# Patient Record
Sex: FEMALE | Race: AMERICAN INDIAN OR ALASKA NATIVE
[De-identification: names, ages, dates, MRNs, and addresses within clinical notes are randomized per-mention and may not be internally consistent; named-entity substitution may affect disease eponyms.]

---

## 2019-02-02 ENCOUNTER — HOSPITAL ENCOUNTER (EMERGENCY)
Dept: HOSPITAL 5 - ED | Age: 54
Discharge: HOME | End: 2019-02-02
Payer: SELF-PAY

## 2019-02-02 DIAGNOSIS — I10: ICD-10-CM

## 2019-02-02 DIAGNOSIS — E78.00: ICD-10-CM

## 2019-02-02 DIAGNOSIS — K21.9: ICD-10-CM

## 2019-02-02 DIAGNOSIS — R11.2: ICD-10-CM

## 2019-02-02 DIAGNOSIS — Z90.711: ICD-10-CM

## 2019-02-02 DIAGNOSIS — F17.200: ICD-10-CM

## 2019-02-02 DIAGNOSIS — E11.9: ICD-10-CM

## 2019-02-02 DIAGNOSIS — K52.9: Primary | ICD-10-CM

## 2019-02-02 DIAGNOSIS — Z79.4: ICD-10-CM

## 2019-02-02 LAB
ALBUMIN SERPL-MCNC: 4.8 G/DL (ref 3.9–5)
ALT SERPL-CCNC: 15 UNITS/L (ref 7–56)
BASOPHILS # (AUTO): 0.1 K/MM3 (ref 0–0.1)
BASOPHILS NFR BLD AUTO: 1.1 % (ref 0–1.8)
BILIRUB UR QL STRIP: (no result)
BLOOD UR QL VISUAL: (no result)
BUN SERPL-MCNC: 8 MG/DL (ref 7–17)
BUN/CREAT SERPL: 10 %
CALCIUM SERPL-MCNC: 9.7 MG/DL (ref 8.4–10.2)
EOSINOPHIL # BLD AUTO: 0.1 K/MM3 (ref 0–0.4)
EOSINOPHIL NFR BLD AUTO: 1.7 % (ref 0–4.3)
HCT VFR BLD CALC: 41.6 % (ref 30.3–42.9)
HEMOLYSIS INDEX: 1
HGB BLD-MCNC: 14.6 GM/DL (ref 10.1–14.3)
LYMPHOCYTES # BLD AUTO: 2.1 K/MM3 (ref 1.2–5.4)
LYMPHOCYTES NFR BLD AUTO: 34.4 % (ref 13.4–35)
MCHC RBC AUTO-ENTMCNC: 35 % (ref 30–34)
MCV RBC AUTO: 83 FL (ref 79–97)
MONOCYTES # (AUTO): 0.6 K/MM3 (ref 0–0.8)
MONOCYTES % (AUTO): 9.8 % (ref 0–7.3)
PH UR STRIP: 7 [PH] (ref 5–7)
PLATELET # BLD: 260 K/MM3 (ref 140–440)
RBC # BLD AUTO: 5.03 M/MM3 (ref 3.65–5.03)
RBC #/AREA URNS HPF: < 1 /HPF (ref 0–6)
UROBILINOGEN UR-MCNC: < 2 MG/DL (ref ?–2)
WBC #/AREA URNS HPF: 1 /HPF (ref 0–6)

## 2019-02-02 PROCEDURE — 81001 URINALYSIS AUTO W/SCOPE: CPT

## 2019-02-02 PROCEDURE — 36415 COLL VENOUS BLD VENIPUNCTURE: CPT

## 2019-02-02 PROCEDURE — 83690 ASSAY OF LIPASE: CPT

## 2019-02-02 PROCEDURE — 96372 THER/PROPH/DIAG INJ SC/IM: CPT

## 2019-02-02 PROCEDURE — 85025 COMPLETE CBC W/AUTO DIFF WBC: CPT

## 2019-02-02 PROCEDURE — 80053 COMPREHEN METABOLIC PANEL: CPT

## 2019-02-02 PROCEDURE — 99283 EMERGENCY DEPT VISIT LOW MDM: CPT

## 2019-02-02 NOTE — EMERGENCY DEPARTMENT REPORT
ED N/V/D HPI





- General


Chief complaint: Nausea/Vomiting/Diarrhea


Stated complaint: ABD PAIN,VOMITING,DIARRHEA


Time Seen by Provider: 02/02/19 09:05


Source: patient


Mode of arrival: Ambulatory


Limitations: No Limitations





- History of Present Illness


Initial comments: 





53-year-old female with past medical history of previous hysterectomy, diabetes 

(insulin and pills), GERD, hypertension, and elevated cholesterol presents to 

the hospital complaints of nausea, vomiting, and diarrhea 3 days.  Patient is 

previously decreased by mouth intolerance.  She complains of pain at the 

epigastric area was a palpation intermittent.  Pain is moderate in intensity.  

She denies melena, hematochezia, hematemesis, fever, recent travel, sick 

contacts, or recent antibiotic use.  Her primary care doctor is [facility]











- Related Data


                                  Previous Rx's











 Medication  Instructions  Recorded  Last Taken  Type


 


Prednisone [Prednisone 10 mg 10 mg PO .TAPER #1 tab.ds.pk 05/20/14 Unknown Rx





(6-Day Pack, 21 Tabs)]    


 


cephALEXin [Keflex] 500 mg PO TID #21 capsule 05/20/14 Unknown Rx


 


glyBURIDE [Glyburide] 5 mg PO QDAY #60 tablet 06/24/15 Unknown Rx


 


Bismuth Subsalicylate 2 tab PO Q4HR PRN #20 tab.chew 02/02/19 Unknown Rx





[Pepto-Bismol]    


 


Famotidine [Pepcid] 20 mg PO BID #30 tablet 02/02/19 Unknown Rx


 


Ondansetron [Zofran Odt] 4 mg PO Q8HR PRN #20 tab.rapdis 02/02/19 Unknown Rx


 


traMADol [Ultram 50 MG tab] 50 mg PO Q6HR PRN #20 tablet 02/02/19 Unknown Rx











                                    Allergies











Allergy/AdvReac Type Severity Reaction Status Date / Time


 


No Known Allergies Allergy   Unverified 05/20/14 08:06














ED Review of Systems


ROS: 


Stated complaint: ABD PAIN,VOMITING,DIARRHEA


Other details as noted in HPI





Comment: All other systems reviewed and negative





ED Past Medical Hx





- Past Medical History


Previous Medical History?: Yes


Hx Hypertension: Yes


Hx Diabetes: Yes


Hx GERD: Yes


Additional medical history: high cholesterol,Abdominal pain





- Surgical History


Past Surgical History?: Yes


Additional Surgical History: Partial hysterectomy





- Social History


Smoking Status: Current Every Day Smoker


Substance Use Type: Prescribed





- Medications


Home Medications: 


                                Home Medications











 Medication  Instructions  Recorded  Confirmed  Last Taken  Type


 


Prednisone [Prednisone 10 mg 10 mg PO .TAPER #1 tab.ds.pk 05/20/14  Unknown Rx





(6-Day Pack, 21 Tabs)]     


 


cephALEXin [Keflex] 500 mg PO TID #21 capsule 05/20/14  Unknown Rx


 


glyBURIDE [Glyburide] 5 mg PO QDAY #60 tablet 06/24/15  Unknown Rx


 


Bismuth Subsalicylate 2 tab PO Q4HR PRN #20 tab.chew 02/02/19  Unknown Rx





[Pepto-Bismol]     


 


Famotidine [Pepcid] 20 mg PO BID #30 tablet 02/02/19  Unknown Rx


 


Ondansetron [Zofran Odt] 4 mg PO Q8HR PRN #20 tab.rapdis 02/02/19  Unknown Rx


 


traMADol [Ultram 50 MG tab] 50 mg PO Q6HR PRN #20 tablet 02/02/19  Unknown Rx














ED Physical Exam





- General


Limitations: No Limitations





- Other


Other exam information: 





General: No limitations, patient is alert in no acute distress


Head exam: Atraumatic, normocephalic


Eyes exam: Normal appearance


ENT: Moist mucous membrane


Neck exam: Normal inspection, full range of motion, no meningismus nontender


Respiratory exam: Clear to auscultation bilateral, no wheezes, rales, crackles


Cardiovascular: Normal rate and rhythm, normal heart sounds


Abdomen: Soft, nondistended, epigastric tenderness, with normal bowel sounds, no

rebound, or guarding


Extremity: Full range of motion normal inspection no deformity


Back: Normal Inspection, full range of motion, no tenderness


Neurologic: Alert, oriented x3, cranial nerves intact, no motor or sensory 

deficit


Psychiatric: normal affect, normal mood


Skin: Warm, dry, intact





ED Course


                                   Vital Signs











  02/02/19 02/02/19





  08:45 12:53


 


Temperature 98.6 F 


 


Pulse Rate 111 H 100 H


 


Respiratory 20 20





Rate  


 


Blood Pressure 143/85 


 


Blood Pressure  149/89





[Left]  


 


O2 Sat by Pulse 100 100





Oximetry  














- Reevaluation(s)


Reevaluation #1: 





02/02/19 09:28


Patient noted to be tachycardic so normal saline in addition to Pepcid, Zofran, 

and Imodium ordered.  Patient states she is always tachycardic and took herself 

off of her medications are prescribed to control heart rate because it was 

causing her chest discomfort


02/02/19 12:04


Patient declined IV after 2 failed attempt.  She did tolerate by mouth Zofran 

and was able to drink fluids afterwards.  She complained of crampy intermittent 

pain there for Bentyl and tramadol ordered.





ED Medical Decision Making





- Lab Data


Result diagrams: 


                                 02/02/19 08:51





                                 02/02/19 08:51








                                   Lab Results











  02/02/19 02/02/19 02/02/19 Range/Units





  08:51 08:51 09:05 


 


WBC  6.0    (4.5-11.0)  K/mm3


 


RBC  5.03    (3.65-5.03)  M/mm3


 


Hgb  14.6 H    (10.1-14.3)  gm/dl


 


Hct  41.6    (30.3-42.9)  %


 


MCV  83    (79-97)  fl


 


MCH  29    (28-32)  pg


 


MCHC  35 H    (30-34)  %


 


RDW  13.0 L    (13.2-15.2)  %


 


Plt Count  260    (140-440)  K/mm3


 


Lymph % (Auto)  34.4    (13.4-35.0)  %


 


Mono % (Auto)  9.8 H    (0.0-7.3)  %


 


Eos % (Auto)  1.7    (0.0-4.3)  %


 


Baso % (Auto)  1.1    (0.0-1.8)  %


 


Lymph #  2.1    (1.2-5.4)  K/mm3


 


Mono #  0.6    (0.0-0.8)  K/mm3


 


Eos #  0.1    (0.0-0.4)  K/mm3


 


Baso #  0.1    (0.0-0.1)  K/mm3


 


Seg Neutrophils %  53.0    (40.0-70.0)  %


 


Seg Neutrophils #  3.2    (1.8-7.7)  K/mm3


 


Sodium   141   (137-145)  mmol/L


 


Potassium   3.9   (3.6-5.0)  mmol/L


 


Chloride   97.8 L   ()  mmol/L


 


Carbon Dioxide   29   (22-30)  mmol/L


 


Anion Gap   18   mmol/L


 


BUN   8   (7-17)  mg/dL


 


Creatinine   0.8   (0.7-1.2)  mg/dL


 


Estimated GFR   > 60   ml/min


 


BUN/Creatinine Ratio   10   %


 


Glucose   171 H   ()  mg/dL


 


Calcium   9.7   (8.4-10.2)  mg/dL


 


Total Bilirubin   0.40   (0.1-1.2)  mg/dL


 


AST   18   (5-40)  units/L


 


ALT   15   (7-56)  units/L


 


Alkaline Phosphatase   104   ()  units/L


 


Total Protein   7.5   (6.3-8.2)  g/dL


 


Albumin   4.8   (3.9-5)  g/dL


 


Albumin/Globulin Ratio   1.8   %


 


Lipase   25   (13-60)  units/L


 


Urine Color    Yellow  (Yellow)  


 


Urine Turbidity    Clear  (Clear)  


 


Urine pH    7.0  (5.0-7.0)  


 


Ur Specific Gravity    1.009  (1.003-1.030)  


 


Urine Protein    30 mg/dl  (Negative)  mg/dL


 


Urine Glucose (UA)    50  (Negative)  mg/dL


 


Urine Ketones    Tr  (Negative)  mg/dL


 


Urine Blood    Neg  (Negative)  


 


Urine Nitrite    Neg  (Negative)  


 


Urine Bilirubin    Neg  (Negative)  


 


Urine Urobilinogen    < 2.0  (<2.0)  mg/dL


 


Ur Leukocyte Esterase    Neg  (Negative)  


 


Urine WBC (Auto)    1.0  (0.0-6.0)  /HPF


 


Urine RBC (Auto)    < 1.0  (0.0-6.0)  /HPF


 


U Epithel Cells (Auto)    1.0  (0-13.0)  /HPF














- Medical Decision Making





Patient be discharged home with symptomatic treatment for gastroenteritis.  

follow up Will be encouraged





- Differential Diagnosis


gastroenteritis, stomach virus, food poisoning, intra-abdominal infection,


Critical Care Time: No


Critical care attestation.: 


If time is entered above; I have spent that time in minutes in the direct care 

of this critically ill patient, excluding procedure time.








ED Disposition


Clinical Impression: 


 Gastroenteritis





Disposition: DC-01 TO HOME OR SELFCARE


Is pt being admited?: No


Does the pt Need Aspirin: No


Condition: Stable


Instructions:  Gastroenteritis (ED)


Additional Instructions: 


Take the medication as prescribed.  Follow up with your doctor or the 

doctor/clinic provided.  Return if symptoms worsen as indicated by your 

discharge instructions


Prescriptions: 


Bismuth Subsalicylate [Pepto-Bismol] 2 tab PO Q4HR PRN #20 tab.chew


 PRN Reason: Diarrhea


Famotidine [Pepcid] 20 mg PO BID #30 tablet


Ondansetron [Zofran Odt] 4 mg PO Q8HR PRN #20 tab.rapdis


 PRN Reason: Nausea And Vomiting


traMADol [Ultram 50 MG tab] 50 mg PO Q6HR PRN #20 tablet


 PRN Reason: Pain


Referrals: 


ALIN JOSÉ MD [Primary Care Provider] - 3-5 Days


[facility] [Provider Group] - 3-5 Days


Time of Disposition: 13:00

## 2019-02-03 VITALS — SYSTOLIC BLOOD PRESSURE: 149 MMHG | DIASTOLIC BLOOD PRESSURE: 89 MMHG

## 2020-08-31 ENCOUNTER — HOSPITAL ENCOUNTER (EMERGENCY)
Dept: HOSPITAL 5 - ED | Age: 55
Discharge: HOME | End: 2020-08-31
Payer: SELF-PAY

## 2020-08-31 DIAGNOSIS — S39.91XA: Primary | ICD-10-CM

## 2020-08-31 DIAGNOSIS — E78.00: ICD-10-CM
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
## 2020-08-31 DIAGNOSIS — I10: ICD-10-CM

## 2020-08-31 DIAGNOSIS — Z79.899: ICD-10-CM
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
## 2020-08-31 DIAGNOSIS — Z90.710: ICD-10-CM

## 2020-08-31 DIAGNOSIS — K21.9: ICD-10-CM

## 2020-08-31 LAB
BASOPHILS # (AUTO): 0.1 K/MM3 (ref 0–0.1)
BASOPHILS NFR BLD AUTO: 0.9 % (ref 0–1.8)
BUN SERPL-MCNC: 7 MG/DL (ref 7–17)
BUN/CREAT SERPL: 10 %
CALCIUM SERPL-MCNC: 9.7 MG/DL (ref 8.4–10.2)
EOSINOPHIL # BLD AUTO: 0.1 K/MM3 (ref 0–0.4)
EOSINOPHIL NFR BLD AUTO: 1.5 % (ref 0–4.3)
HCT VFR BLD CALC: 44.5 % (ref 30.3–42.9)
HEMOLYSIS INDEX: 34
HGB BLD-MCNC: 14.8 GM/DL (ref 10.1–14.3)
LYMPHOCYTES # BLD AUTO: 2.1 K/MM3 (ref 1.2–5.4)
LYMPHOCYTES NFR BLD AUTO: 26.8 % (ref 13.4–35)
MCHC RBC AUTO-ENTMCNC: 33 % (ref 30–34)
MCV RBC AUTO: 85 FL (ref 79–97)
MONOCYTES # (AUTO): 0.6 K/MM3 (ref 0–0.8)
MONOCYTES % (AUTO): 7.6 % (ref 0–7.3)
PLATELET # BLD: 279 K/MM3 (ref 140–440)
RBC # BLD AUTO: 5.21 M/MM3 (ref 3.65–5.03)

## 2020-08-31 PROCEDURE — 74177 CT ABD & PELVIS W/CONTRAST: CPT

## 2020-08-31 PROCEDURE — 85025 COMPLETE CBC W/AUTO DIFF WBC: CPT

## 2020-08-31 PROCEDURE — 99284 EMERGENCY DEPT VISIT MOD MDM: CPT

## 2020-08-31 PROCEDURE — 80048 BASIC METABOLIC PNL TOTAL CA: CPT

## 2020-08-31 PROCEDURE — 74022 RADEX COMPL AQT ABD SERIES: CPT

## 2020-08-31 PROCEDURE — 36415 COLL VENOUS BLD VENIPUNCTURE: CPT
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
## 2020-08-31 NOTE — XRAY REPORT
XR abd series w cxr 1VCHEST 1 VIEW, XR ABDOMEN 1 VIEW



INDICATION / CLINICAL INFORMATION:

pain.



COMPARISON: 

None available.



FINDINGS:



SUPPORT DEVICES: None.



HEART / MEDIASTINUM: No significant abnormality. 



LUNGS / PLEURA: Lungs are clear.  Costophrenic sulci are sharp. No pneumothorax.



ABDOMEN: Nonobstructive bowel gas pattern. No pneumoperitoneum.



ADDITIONAL FINDINGS: No significant additional findings.



IMPRESSION:

1. No acute findings. 

2. Nonobstructive bowel gas pattern.



Signer Name: Chele Hagen MD 

Signed: 8/31/2020 3:39 PM

Workstation Name: [id number]
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
## 2020-09-01 VITALS — DIASTOLIC BLOOD PRESSURE: 107 MMHG | SYSTOLIC BLOOD PRESSURE: 182 MMHG
